# Patient Record
Sex: FEMALE | Race: WHITE | NOT HISPANIC OR LATINO | URBAN - METROPOLITAN AREA
[De-identification: names, ages, dates, MRNs, and addresses within clinical notes are randomized per-mention and may not be internally consistent; named-entity substitution may affect disease eponyms.]

---

## 2022-01-30 ENCOUNTER — WEB ENCOUNTER (OUTPATIENT)
Dept: URBAN - METROPOLITAN AREA CLINIC 94 | Facility: CLINIC | Age: 37
End: 2022-01-30

## 2022-01-31 ENCOUNTER — OFFICE VISIT (OUTPATIENT)
Dept: URBAN - METROPOLITAN AREA CLINIC 94 | Facility: CLINIC | Age: 37
End: 2022-01-31
Payer: MEDICAID

## 2022-01-31 ENCOUNTER — WEB ENCOUNTER (OUTPATIENT)
Dept: URBAN - METROPOLITAN AREA CLINIC 94 | Facility: CLINIC | Age: 37
End: 2022-01-31

## 2022-01-31 VITALS
WEIGHT: 212 LBS | TEMPERATURE: 97.7 F | HEIGHT: 62 IN | HEART RATE: 65 BPM | DIASTOLIC BLOOD PRESSURE: 60 MMHG | SYSTOLIC BLOOD PRESSURE: 111 MMHG | BODY MASS INDEX: 39.01 KG/M2

## 2022-01-31 DIAGNOSIS — K60.2 ANAL FISSURE: ICD-10-CM

## 2022-01-31 DIAGNOSIS — K62.5 RECTAL BLEEDING: ICD-10-CM

## 2022-01-31 DIAGNOSIS — R10.32 LLQ PAIN: ICD-10-CM

## 2022-01-31 DIAGNOSIS — Z87.19 HISTORY OF CONSTIPATION: ICD-10-CM

## 2022-01-31 PROCEDURE — 99204 OFFICE O/P NEW MOD 45 MIN: CPT | Performed by: PHYSICIAN ASSISTANT

## 2022-01-31 RX ORDER — SERTRALINE 50 MG/1
1 TABLET TABLET, FILM COATED ORAL ONCE A DAY
Status: ACTIVE | COMMUNITY

## 2022-01-31 RX ORDER — METHYLDOPA/HYDROCHLOROTHIAZIDE 250MG-15MG
AS DIRECTED TABLET ORAL
Status: ACTIVE | COMMUNITY

## 2022-01-31 RX ORDER — POLYETHYLENE GLYCOL-3350 AND ELECTROLYTES WITH FLAVOR PACK 240; 5.84; 2.98; 6.72; 22.72 G/278.26G; G/278.26G; G/278.26G; G/278.26G; G/278.26G
AS DIRECTED POWDER, FOR SOLUTION ORAL ONCE
Qty: 1 | Refills: 0 | OUTPATIENT
Start: 2022-01-31 | End: 2022-02-01

## 2022-01-31 RX ORDER — IBUPROFEN 400 MG/1
1 TABLET WITH FOOD OR MILK AS NEEDED TABLET ORAL
Refills: 0 | Status: ACTIVE | COMMUNITY
Start: 1900-01-01

## 2022-01-31 NOTE — HPI-TODAY'S VISIT:
37 yo F evaluated today for hx of LLQ pain with hx of ? fistula.   Pt reports a 2 mos hx of LLQ abdominal pain which is getting progressively worse and even affects her ability to walk. Pt states that pain is worse with passing stools. She is also completeing GYN work-up pelvic US reveal enlarged ovaries and patient has been referred to surgeon to discuss hysterectomy. Pt did have 3 C -sections.   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 She noticed this over the past 6 mos but has not seeked tx. She reports that it drains a yellow, pink drainage routinely. She also reports discomfort with sitting on left buttocks. She denies hx or family hx of IBD.   Pt does report hx of constipation which resolved with high fiber diet. She now has regular BMs.  Pt hx of external hemorrhoids since pregnancy,  anal fissures with rectal bleeding but improved since she no longer has constipation. Pt reports a 40 lb intentional wt loss.   CT 1/8/22 - No acute abdominal or pelvic findings.  CT 12/1/21 No etiology identified to explain patient's symptoms. No hydronephrosis. Correlate with urinalysis. Mild thickening of the distal esophagus may be due to underdistention or reactive. Correlate with symptoms. Likely benign small left ovarian corpus luteal/hemorrhagic cyst. Trace pelvic free fluid, commonly physiologic in premenopausal patients.  Of note, patient evaluated by DR Wade 2017 for rectal bleeding and colonoscopy recommended. This was never scheduled due to insurance reasons.

## 2022-01-31 NOTE — PHYSICAL EXAM GASTROINTESTINAL
Abdomen , soft, lower abdominal tenderness bilaterally, nondistended , no guarding or rigidity , no masses palpable , normal bowel sounds , Liver and Spleen , no hepatomegaly present , no hepatosplenomegaly , liver nontender , spleen not palpable  Rectal Exam: external hemorrhoids noted, No thrombosis or bleeding present, ? small ulceration noted /doesn't appear to be fistula.

## 2022-02-02 LAB
ACCA: 43
ALCA: 16
AMCA: 101
ATYPICAL PANCA: NEGATIVE
C-REACTIVE PROTEIN, QUANT: <1
COMMENTS: (no result)
ENDOMYSIAL ANTIBODY IGA: NEGATIVE
GASCA: 69
IMMUNOGLOBULIN A, QN, SERUM: 176
SEDIMENTATION RATE-WESTERGREN: 9
T-TRANSGLUTAMINASE (TTG) IGA: <2

## 2022-02-15 ENCOUNTER — OFFICE VISIT (OUTPATIENT)
Dept: URBAN - METROPOLITAN AREA SURGERY CENTER 17 | Facility: SURGERY CENTER | Age: 37
End: 2022-02-15
Payer: MEDICAID

## 2022-02-15 DIAGNOSIS — R10.32 ABDOMINAL CRAMPING IN LEFT LOWER QUADRANT: ICD-10-CM

## 2022-02-15 DIAGNOSIS — K62.5 ANAL BLEEDING: ICD-10-CM

## 2022-02-15 PROCEDURE — 45378 DIAGNOSTIC COLONOSCOPY: CPT | Performed by: INTERNAL MEDICINE

## 2022-02-15 PROCEDURE — G8907 PT DOC NO EVENTS ON DISCHARG: HCPCS | Performed by: INTERNAL MEDICINE

## 2022-02-15 RX ORDER — METHYLDOPA/HYDROCHLOROTHIAZIDE 250MG-15MG
AS DIRECTED TABLET ORAL
Status: ACTIVE | COMMUNITY

## 2022-02-15 RX ORDER — SERTRALINE 50 MG/1
1 TABLET TABLET, FILM COATED ORAL ONCE A DAY
Status: ACTIVE | COMMUNITY

## 2022-02-15 RX ORDER — IBUPROFEN 400 MG/1
1 TABLET WITH FOOD OR MILK AS NEEDED TABLET ORAL
Refills: 0 | Status: ACTIVE | COMMUNITY
Start: 1900-01-01

## 2022-03-08 ENCOUNTER — OFFICE VISIT (OUTPATIENT)
Dept: URBAN - METROPOLITAN AREA CLINIC 94 | Facility: CLINIC | Age: 37
End: 2022-03-08

## 2022-04-18 ENCOUNTER — DASHBOARD ENCOUNTERS (OUTPATIENT)
Age: 37
End: 2022-04-18

## 2022-04-18 ENCOUNTER — CLAIMS CREATED FROM THE CLAIM WINDOW (OUTPATIENT)
Dept: URBAN - METROPOLITAN AREA CLINIC 94 | Facility: CLINIC | Age: 37
End: 2022-04-18
Payer: MEDICAID

## 2022-04-18 VITALS
HEIGHT: 62 IN | SYSTOLIC BLOOD PRESSURE: 122 MMHG | TEMPERATURE: 97.5 F | BODY MASS INDEX: 38.46 KG/M2 | HEART RATE: 90 BPM | DIASTOLIC BLOOD PRESSURE: 72 MMHG | WEIGHT: 209 LBS

## 2022-04-18 DIAGNOSIS — R10.32 LLQ PAIN: ICD-10-CM

## 2022-04-18 DIAGNOSIS — Z87.19 HISTORY OF CONSTIPATION: ICD-10-CM

## 2022-04-18 DIAGNOSIS — K64.8 INTERNAL HEMORRHOIDS: ICD-10-CM

## 2022-04-18 DIAGNOSIS — K60.2 ANAL FISSURE: ICD-10-CM

## 2022-04-18 PROCEDURE — 99213 OFFICE O/P EST LOW 20 MIN: CPT | Performed by: PHYSICIAN ASSISTANT

## 2022-04-18 PROCEDURE — 99213 OFFICE O/P EST LOW 20 MIN: CPT | Performed by: INTERNAL MEDICINE

## 2022-04-18 RX ORDER — IBUPROFEN 400 MG/1
1 TABLET WITH FOOD OR MILK AS NEEDED TABLET ORAL
Refills: 0 | Status: ACTIVE | COMMUNITY
Start: 1900-01-01

## 2022-04-18 RX ORDER — SERTRALINE 50 MG/1
1 TABLET TABLET, FILM COATED ORAL ONCE A DAY
Status: ACTIVE | COMMUNITY

## 2022-04-18 RX ORDER — METHYLDOPA/HYDROCHLOROTHIAZIDE 250MG-15MG
AS DIRECTED TABLET ORAL
Status: ACTIVE | COMMUNITY

## 2022-04-18 NOTE — HPI-TODAY'S VISIT:
37 yo F evaluated today for post procedure f/u visit for hx of LLQ pain.   Colonoscopy 2/2022 reveals few sigmoid diverticulosis and IH. No fistula identified   Since her last visit, LLQ pain is improved with high fiber diet. Pt reports improvement in bowel habits and now denies constipation. She tries to get at least 20-3o gm daily. Pt denies rectal bleeding since last visit.   Pt hx of external hemorrhoids since pregnancy,  anal fissures but improved since she no longer has constipation. Pt reports a 40 lb intentional wt loss.   CT 1/8/22 - No acute abdominal or pelvic findings.  CT 12/1/21 No etiology identified to explain patient's symptoms. No hydronephrosis. Correlate with urinalysis. Mild thickening of the distal esophagus may be due to underdistention or reactive. Correlate with symptoms. Likely benign small left ovarian corpus luteal/hemorrhagic cyst. Trace pelvic free fluid, commonly physiologic in premenopausal patients.

## 2022-04-18 NOTE — PHYSICAL EXAM GASTROINTESTINAL
Abdomen , soft,  nontender, nondistended , no guarding or rigidity , no masses palpable , normal bowel sounds , Liver and Spleen , no hepatomegaly present , no hepatosplenomegaly , liver nontender , spleen not palpable  Rectal Exam: external hemorrhoids noted, No thrombosis or bleeding present, ? small ulceration noted /doesn't appear to be fistula.

## 2022-04-28 ENCOUNTER — TELEPHONE ENCOUNTER (OUTPATIENT)
Dept: URBAN - METROPOLITAN AREA CLINIC 94 | Facility: CLINIC | Age: 37
End: 2022-04-28

## 2022-10-17 ENCOUNTER — OFFICE VISIT (OUTPATIENT)
Dept: URBAN - METROPOLITAN AREA CLINIC 94 | Facility: CLINIC | Age: 37
End: 2022-10-17

## 2022-10-17 RX ORDER — METHYLDOPA/HYDROCHLOROTHIAZIDE 250MG-15MG
AS DIRECTED TABLET ORAL
Status: ACTIVE | COMMUNITY

## 2022-10-17 RX ORDER — IBUPROFEN 400 MG/1
1 TABLET WITH FOOD OR MILK AS NEEDED TABLET ORAL
Refills: 0 | Status: ACTIVE | COMMUNITY
Start: 1900-01-01

## 2022-10-17 RX ORDER — SERTRALINE 50 MG/1
1 TABLET TABLET, FILM COATED ORAL ONCE A DAY
Status: ACTIVE | COMMUNITY

## 2023-12-28 NOTE — PHYSICAL EXAM CHEST:
no lesions,  no deformities,  no traumatic injuries,  no significant scars are present,  chest wall non-tender,  no masses present, breathing is unlabored without accessory muscle use,normal breath sounds
Yes, Attending Physician has been notified